# Patient Record
Sex: FEMALE | Race: WHITE | NOT HISPANIC OR LATINO
[De-identification: names, ages, dates, MRNs, and addresses within clinical notes are randomized per-mention and may not be internally consistent; named-entity substitution may affect disease eponyms.]

---

## 2021-12-13 ENCOUNTER — APPOINTMENT (OUTPATIENT)
Dept: BREAST CENTER | Facility: CLINIC | Age: 46
End: 2021-12-13
Payer: COMMERCIAL

## 2021-12-13 ENCOUNTER — NON-APPOINTMENT (OUTPATIENT)
Age: 46
End: 2021-12-13

## 2021-12-13 VITALS
BODY MASS INDEX: 23.62 KG/M2 | DIASTOLIC BLOOD PRESSURE: 76 MMHG | HEART RATE: 85 BPM | SYSTOLIC BLOOD PRESSURE: 112 MMHG | WEIGHT: 165 LBS | HEIGHT: 70 IN

## 2021-12-13 PROCEDURE — 99205 OFFICE O/P NEW HI 60 MIN: CPT

## 2021-12-13 PROCEDURE — 99072 ADDL SUPL MATRL&STAF TM PHE: CPT

## 2021-12-22 ENCOUNTER — NON-APPOINTMENT (OUTPATIENT)
Age: 46
End: 2021-12-22

## 2021-12-27 ENCOUNTER — TRANSCRIPTION ENCOUNTER (OUTPATIENT)
Age: 46
End: 2021-12-27

## 2021-12-29 ENCOUNTER — NON-APPOINTMENT (OUTPATIENT)
Age: 46
End: 2021-12-29

## 2021-12-31 ENCOUNTER — NON-APPOINTMENT (OUTPATIENT)
Age: 46
End: 2021-12-31

## 2022-01-03 ENCOUNTER — APPOINTMENT (OUTPATIENT)
Dept: GYNECOLOGIC ONCOLOGY | Facility: CLINIC | Age: 47
End: 2022-01-03
Payer: COMMERCIAL

## 2022-01-03 ENCOUNTER — NON-APPOINTMENT (OUTPATIENT)
Age: 47
End: 2022-01-03

## 2022-01-03 VITALS
OXYGEN SATURATION: 97 % | SYSTOLIC BLOOD PRESSURE: 120 MMHG | WEIGHT: 165 LBS | TEMPERATURE: 97.6 F | RESPIRATION RATE: 18 BRPM | DIASTOLIC BLOOD PRESSURE: 79 MMHG | HEIGHT: 70 IN | BODY MASS INDEX: 23.62 KG/M2 | HEART RATE: 96 BPM

## 2022-01-03 DIAGNOSIS — Z80.0 FAMILY HISTORY OF MALIGNANT NEOPLASM OF DIGESTIVE ORGANS: ICD-10-CM

## 2022-01-03 PROCEDURE — 99204 OFFICE O/P NEW MOD 45 MIN: CPT

## 2022-01-03 PROCEDURE — 99072 ADDL SUPL MATRL&STAF TM PHE: CPT

## 2022-01-03 NOTE — PHYSICAL EXAM
[Normal] : Recto-Vaginal Exam: Normal [Fully active, able to carry on all pre-disease performance without restriction] : Status 0 - Fully active, able to carry on all pre-disease performance without restriction [Abnormal] : Adnexa(ae): Abnormal

## 2022-01-03 NOTE — HISTORY OF PRESENT ILLNESS
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 US-core biopsy is recommended. BIRADS 5. \par \par 2) US-core biopsy left breast 4:00-N5 (21): Poorly differentiated invasive ductal carcinoma. Malignant. Pending receptors. In addition to the US mass which was sampled, there are adjacent pleomorphic microcalcifications on the mammogram suggesting involvement of at least a 3 cm area. Tubule formation 3/3, nuclear pleomorphism 3/3, mitotic counts 2/3), measuring 1.6 cm in maximal length in this material. ER 96%, IA 68%, HER2 john score 3+, Ki-67 51%.\par \par 3) [facility] Genetic Testing (21) showed one pathologic variant in APC. c 7715 C>G ([id number]*), heterozygous. Associated with autosomal dominant familial adenomatous polyposis, attenuated familial adenomatous polyposis and gastric adenocarcinoma and proximal polyposis of the stomach. \par \par 4) MRI Bilateral Breasts w/wo Contrast (21): There is a 2.5 x 2.7 x 1.8 cm lobulated enhancing mass in the lower outer left breast consistent with the recently diagnosed malignancy. No significant axillary or internal mammary LAD. There is a focal area of clumped non-mass enhancement which extends from the anterior and inferior aspect of the mass likely representing intraductal tumor extension. No abnormal enhancement or enhancing masses seen elsewhere in the left breast or right breast. BIRADS 6. 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 \par \par [address] yo [address] Matty Rivera (Breast Surgeon) for high-risk Gyn screening. Patient scheduled to undergo L stereotactic biopsy for adjacent calcifications seen on mammogram. Patient planning to receive receiving neoadjuvant chemotherapy with TCHP x 6 cycles with Dr. Garcia to start 22. Patient genetic testing with APC gene mutation, pending full genetic counseling appointment. \par \par OBHx: . Denies miscarriages or . Nulliparous\par GYNHx: LMP 21, lasts 4-5 days, occurs every 28 days. Uses menstrual cups\par MedHx: None\par SurgHx: Deviated septum, cardiac ablation for ventricular tachycardia\par FamH: Maternal grandmother had colon cancer. No other known cancers in the family. \par Meds: None. Denies herbal remedies\par Allergies: Sulphite (rash if topical, anaphylaxis if injected), novocaine/lidocaine (migraine/photosensitivity/burning at site of injection), calcium citrate (anaphylactic). Cats and dogs. \par Social Hx: Socially smoked over pandemic. Social drinker. Not sexually active. \par \par HCM\par Denies weight loss. Maintains appetite. Has not been sleeping well recently due to anxiety. Denies cough, shortness of breath, palpitations, headaches, visual changes, diarrhea/constipation, nausea, and vomiting. Fever. \par \par Last Pap smear, approximately 2 months ago, in 2021 at walk-in Gyn clinic in [place], normal per patient. She does Pap smears every 6 months for HPV+

## 2022-01-03 NOTE — DISCUSSION/SUMMARY
[Reviewed Clinical Lab Test(s)] : Results of clinical tests were reviewed. [Reviewed Radiology Report(s)] : Radiology reports were reviewed. [Visit Time ___ Minutes] : [unfilled] minutes [FreeTextEntry1] : 45 yo F presents for evaluation of palpable left breast mass s/p US-guided core biopsy (12/01/21), with pathology consistent with ER/TX/HER2+ poorly differentiated invasive ductal carcinoma of L breast. Initially mammogram and US (11/18/21) showed LUQ 3:00, 2.4 cm irregular shaped mass with associated L3-4:00 pleomorphic microcalcifications. BIRADS 5. The mass and calcifications combined suggests carcinoma to be at least a 3cm area. Planning for TCHP chemotherapy x 6 cycles with Dr. Garcia, starting 01/04/22. Incidentally found to be APC heterozygous. \par \par - Explained to patient that given ER/TX+; her oncologist may start her on tamoxifen as adjuvant therapy, which may increase the risk of ovarian cysts, endometrial thickening, endometrial cancer, uterine sarcoma, DVT. \par - Asked Dr. Garcia to call back to describe her particular concerns for this patient. \par - APC heterozygosity is not commonly associated with gynecological malignancy\par - Obtain TVUS for evaluation of the ovaries to determine a baseline\par - Follow-up in 6 months\par

## 2022-01-03 NOTE — PAST MEDICAL HISTORY
[Definite ___ (Date)] : the last menstrual period was [unfilled] [Normal Amount/Duration] : it was of a normal amount and duration [Normal Duration] : the duration was normal [Regular Cycle Intervals] : have been regular [Total Preg ___] : G[unfilled]

## 2022-01-05 ENCOUNTER — NON-APPOINTMENT (OUTPATIENT)
Age: 47
End: 2022-01-05

## 2022-03-07 ENCOUNTER — APPOINTMENT (OUTPATIENT)
Dept: HEMATOLOGY ONCOLOGY | Facility: CLINIC | Age: 47
End: 2022-03-07

## 2022-03-14 ENCOUNTER — APPOINTMENT (OUTPATIENT)
Dept: HEMATOLOGY ONCOLOGY | Facility: CLINIC | Age: 47
End: 2022-03-14
Payer: COMMERCIAL

## 2022-03-14 PROCEDURE — 99204 OFFICE O/P NEW MOD 45 MIN: CPT | Mod: 95

## 2022-03-29 ENCOUNTER — NON-APPOINTMENT (OUTPATIENT)
Age: 47
End: 2022-03-29

## 2022-03-29 ENCOUNTER — APPOINTMENT (OUTPATIENT)
Dept: HEMATOLOGY ONCOLOGY | Facility: CLINIC | Age: 47
End: 2022-03-29

## 2022-04-11 NOTE — ASSESSMENT
[FreeTextEntry1] : The visit was provided via telehealth using real-time 2-way audio visual technology. The patient, Izabel Montalvo, was located at home in [place], [place] at the time of the visit. The genetic counselor, Corrie Stanford, was located at the medical offices in [place], [place]. The physician, Dr. Jeffrey Jean, was in [place], [place]. Verbal consent for telehealth services was given on 3/14/22 by the patient, Izabel Montalvo.\par \par REASON FOR CONSULT\par Izabel Montalvo is a 46-year-old female referred by Dr. Tamela Lakhani for cancer genetic counseling and a discussion regarding her positive genetic testing results related to hereditary cancer predisposition. Ms. Montalvo was seen via telehealth on 2022 at which time medical and family history was ascertained and a pedigree constructed. \par \par RELEVANT MEDICAL HISTORY\par Ms. Montalvo was diagnosed with left breast cancer in 2021 at the age of 46. Pathology report revealed invasive ductal carcinoma (ER+/GA+/HER2+) and DCIS (ER-/GA-). She is currently receiving neoadjuvant chemotherapy prior to proceeding with surgery. She pursued genetic testing on  using [employer]’s Common Hereditary Cancers Panel, ordered by Dr. Lakhani, and a pathogenic mutation was detected in the APC gene (c.7715C>G; [id number]*).\par \par Of note, Ms. Montalvo has never had a colonoscopy.\par \par OTHER MEDICAL AND SURGICAL HISTORY:\par •	Medical History: ventricular tachycardia (s/p ablation)\par •	Surgical History: deviated septum\par \par HORMONAL HISTORY:\par Obstetrical History: \par Age at Menarche: 16\par Menopausal Status: Premenopausal \par Age at First Live Birth: N/A\par Oral Contraceptive Use: Yes, at least 5 years\par Hormone Replacement Therapy: No\par \par CANCER SCREENING HISTORY:  \par Breast: \par •	Mammography: 21- rec L biopsy\par •	Sonography: 21- rec L biopsy\par •	MRI: 21- rec additional L biopsy\par GYN:\par •	Pelvic Examination: Annual- reportedly wnl\par Colon:\par •	Colonoscopy: No\par •	Upper Endoscopy: No \par Skin:  \par •	FBSE: Yes (many years ago)\par •	Lesions biopsied/removed: Yes- stomach (reportedly benign)\par \par SOCIAL HISTORY:\par •	Tobacco-product use: No\par •	Environmental exposures: No\par \par FAMILY HISTORY:\par Maternal ancestry was reported as Algerian and paternal ancestry was reported as Serbian. Ashkenazi Restoration ancestry is unknown. A detailed family history of cancer was ascertained, see below and scanned chart for pedigree. \par \par Of note, Ms. Mnotalvo reported her mother receives colonoscopies almost every year due to a history of colon polyps. \par \par Additionally, Ms. Montalvo has very limited information on paternal family history of cancer.\par \par According to Ms. Montalvo no one else in the family has had germline testing for cancer susceptibility. Consanguinity was denied. \par \par RESULTS INTERPRETATION AND ASSESSMENT:\par We reviewed with Ms. Montalvo that she tested positive for a pathogenic mutation in the APC gene.  Germline mutations in the APC gene are associated with autosomal-dominant colorectal cancer predisposition syndromes called Familial Adenomatous Polyposis (FAP), Attenuated Familial Adenomatous Polyposis (AFAP), and rarely Gastric Adenocarcinoma and Proximal Polyposis of the stomach (GAPPS).\par \par Individuals with classic FAP have hundreds to thousands of adenomatous polyps in their colon and rectum. Polyps may also develop in the stomach (fundic gland polyps) and small intestine. These adenomas usually begin to develop during the teenage years or young adulthood and, if left untreated, the risk of developing cancer by the early forties is close to 100%. Cancers of the thyroid (typically papillary thyroid carcinoma), pancreas, brain/CNS, and liver (rarely after 6 years of age) are also associated with FAP. Furthermore, Congenital Hypertrophy of Retinal Pigment Epithelium (CHRPE), desmoid tumors, osteomas, epidermoid cysts, extra teeth or dental abnormalities are features seen in individuals with FAP. \par \par Individuals with AFAP have a milder form of FAP. These individuals develop fewer adenomatous polyps (less than 100) usually at a later age (approximately 50 years of age) than individual with classic FAP. They have an overall lower lifetime risk of developing colorectal cancer (up to 70%) and have variable risks for extracolonic manifestations such as thyroid cancer gastric polyps or cancer, and duodenal polyps or cancer. \par \par Of note, the risk of developing FAP vs. AFAP vs. GAPPS is often correlated with the position of the mutation in the APC gene. Mutations in the 3’ end of the gene, like the mutation Ms. Montalvo harbors, typically correlate with an AFAP presentation however mutations in the 3’ end of the gene also tend to be seen more often in individuals with a personal or family history of desmoid tumors. \par \par We discussed with Ms. Montalvo that until she undergoes colonoscopy to assess for the presence and number of polyps, we cannot be certain which form of FAP she presents with. Given this, we have listed both screening guidelines for both FAP and AFAP below. We recommend she return for a follow-up visit after she undergoes colonoscopy to re-review appropriate screening protocol.  \par \par PATIENT MANAGEMENT IMPLICATIONS:\par Given Ms. Montalvo’s personal and current reported family history of cancer, and her APC positive genetic test results, the following screening guidelines and risk-reducing recommendations were discussed:\par \par BREAST:\par •	It was discussed that there is no current data to suggest an increased risk for breast cancer associated with mutations in the APC gene.\par •	Management and long-term surveillance should be based on Ms. Montalvo’s on- or post-treatment protocol as recommended by her breast care team.\par \par COLON: \par •	We recommended Ms. Montalvo undergo a baseline colonoscopy after she finishes her neoadjuvant chemotherapy regimen. \par •	Repeat colonoscopies every 1-2 years if polyp burden remains manageable endoscopically.\par •	If polyp burden is not manageable endoscopically, consider colectomy.\par \par DUODENAL:\par •	We recommended Ms. Montalvo undergo a baseline endoscopy in conjunction with colonoscopy after she finishes her neoadjuvant chemotherapy regimen. \par •	Frequency of upper endoscopy surveillance is dependent on polyp burden and family history however if normal, repeat endoscopy every 3-5 years.\par \par THYROID:\par •	Baseline thyroid ultrasound was recommended for Ms. Montalvo after her breast cancer has stabilized.\par •	If normal, consider repeating the ultrasound every 2-5 years.\par •	If abnormal, consider referral to a thyroid specialist. \par \par GASTRIC: Classic FAP Only\par •	Gastric cancer screening is individualized in the setting of FAP. Then need for specialized surveillance or surgery should be considered in presence of high-risk histologic features, preferably at a center of expertise \par \par CNS TUMORS: Classic FAP Only\par •	We encouraged prompt reportion of abnormal symptoms or signs of neurological cancers to the patient’s physician.\par \par DESMOID TUMORS: Classic FAP Only\par •	Suggestive abdominal symptoms should prompt immediate abdominal imaging. If personal history of symptomatic desmoids, consider imaging with abdominal MRI (with and without contrast) or CT (with contrast) no less frequently than annually.\par \par PANCREATIC: Classic FAP Only\par •	High-level evidence to support routine pancreatic screening is lacking. Screening on a research basis may be considered for individuals with a family history of pancreatic cancer presenting with classic FAP. Given the absence of pancreatic cancer in Ms. Montalvo’s family, pancreatic screening is not recommended at this time. \par SMALL BOWEL: Classic FAP Only\par •	High-level evidence to support routine bowel screening distal to the duodenum is lacking; however, consider small bowel visualization, especially if advanced duodenal polyposis.\par \par HEPATIC: Classic FAP Only (childhood)\par •	Consider liver palpation, abdominal ultrasound and AFP measurement every 3-6 months for the first 5 years of life due to the risk of hepatoblastoma.\par \par OTHER:\par •	We recommended Ms. Montalvo undergo a thorough annual physical examination with special attention paid to the abdominal area to palpate for desmoid tumors given the location of her APC mutation even if we determine her presentation correlates with AFAP.\par •	In the absence of other indications, Ms. Montalvo should practice age-appropriate cancer screening of other organ systems as recommended for the general population.\par \par FAMILY MEMBER AND REPRODUCTIVE IMPLICATIONS:\par This mutation is inherited in an autosomal dominant pattern. We recommend the patient’s first-degree relatives, specifically her siblings and parents, pursue genetic counseling and genetic testing as there is a 50% chance they also have the same mutation. Ms. Montalvo was made aware that if any at-risk relatives wanted to pursue genetic testing any time in the future, we would be happy to see them and coordinate testing. \par \par Please note, since screening for hepatoblastoma begins in infancy, genetic testing for infants and children is recommended.\par \par Additionally, for anyone found to carry this APC mutation the risk of passing on this mutation to a future generation is 50%. Since the genetic mutation is known, pre-implantation genetic diagnosis (PGD) is possible. PGD and prenatal diagnosis were discussed briefly.\par \par \par PLAN:\par 1.	See above note for recommended management.\par 2.	We recommended the patient schedule an additional follow-up session with us after she undergoes baseline colonoscopy to determine appropriate screening protocol.\par 3.	We encourage sharing these results with family members.  They have a risk to have inherited the same mutation.  Other family may benefit from genetic testing and should contact a certified genetic counselor specializing in cancer.  Due to HIPAA and [place] laws, we are unable to directly contact other family at risk, but we are available should family members wish to reach out to us.\par 4.	Clinic note will be available in the patient’s [facility] portal.\par 5.	Genetic knowledge changes rapidly. We encouraged re-contacting Cancer Genetics every 2-3 years for any changes in screening recommendations or sooner if there are significant changes in personal or family history.\par \par For any additional questions please call Cancer Genetics at [phone]. \par \par \par Corrie Stanford MS, [facility] [place] [facility]\par Genetic Counselor, Cancer Genetics\par \par \par Attending Attestation:\par \par I have reviewed and edited the genetic counselor's note and I agree with the assessment and plan as documented. I spent approximately 45 minutes in total time of which approximately 20 minutes was face-to-face (via 2-way audiovisual telemedicine connection) with Ms. Montalvo reviewing her relevant personal and family history, the genetic testing results, our risk-assessment, and options for future cancer risk-reduction for the patient and her relevant family members. Over half this time was spent in counseling and coordination of care.\par \par Jeffrey Jean MD\par Chief, Cancer Genetics\par [facility]\par \par \par CC: \par Tamela Lakhani MD\par \par \par \par \par

## 2022-05-04 ENCOUNTER — OUTPATIENT (OUTPATIENT)
Dept: OUTPATIENT SERVICES | Facility: HOSPITAL | Age: 47
LOS: 1 days | End: 2022-05-04
Payer: COMMERCIAL

## 2022-05-04 ENCOUNTER — RESULT REVIEW (OUTPATIENT)
Age: 47
End: 2022-05-04

## 2022-05-04 DIAGNOSIS — C50.912 MALIGNANT NEOPLASM OF UNSPECIFIED SITE OF LEFT FEMALE BREAST: ICD-10-CM

## 2022-05-04 LAB — SURGICAL PATHOLOGY STUDY: SIGNIFICANT CHANGE UP

## 2022-05-04 PROCEDURE — 88321 CONSLTJ&REPRT SLD PREP ELSWR: CPT

## 2022-05-05 ENCOUNTER — NON-APPOINTMENT (OUTPATIENT)
Age: 47
End: 2022-05-05

## 2022-05-05 ENCOUNTER — APPOINTMENT (OUTPATIENT)
Dept: BREAST CENTER | Facility: CLINIC | Age: 47
End: 2022-05-05
Payer: COMMERCIAL

## 2022-05-05 VITALS
WEIGHT: 171 LBS | DIASTOLIC BLOOD PRESSURE: 69 MMHG | HEIGHT: 70 IN | SYSTOLIC BLOOD PRESSURE: 112 MMHG | HEART RATE: 93 BPM | BODY MASS INDEX: 24.48 KG/M2

## 2022-05-05 DIAGNOSIS — R92.8 OTHER ABNORMAL AND INCONCLUSIVE FINDINGS ON DIAGNOSTIC IMAGING OF BREAST: ICD-10-CM

## 2022-05-05 PROCEDURE — 99214 OFFICE O/P EST MOD 30 MIN: CPT

## 2022-05-05 PROCEDURE — 99072 ADDL SUPL MATRL&STAF TM PHE: CPT

## 2022-05-13 ENCOUNTER — NON-APPOINTMENT (OUTPATIENT)
Age: 47
End: 2022-05-13

## 2022-05-16 ENCOUNTER — NON-APPOINTMENT (OUTPATIENT)
Age: 47
End: 2022-05-16

## 2022-05-19 ENCOUNTER — NON-APPOINTMENT (OUTPATIENT)
Age: 47
End: 2022-05-19

## 2022-06-12 ENCOUNTER — RESULT REVIEW (OUTPATIENT)
Age: 47
End: 2022-06-12

## 2022-06-14 ENCOUNTER — APPOINTMENT (OUTPATIENT)
Dept: BREAST CENTER | Facility: AMBULATORY SURGERY CENTER | Age: 47
End: 2022-06-14

## 2022-06-15 ENCOUNTER — OUTPATIENT (OUTPATIENT)
Dept: OUTPATIENT SERVICES | Facility: HOSPITAL | Age: 47
LOS: 1 days | End: 2022-06-15
Payer: COMMERCIAL

## 2022-06-15 ENCOUNTER — APPOINTMENT (OUTPATIENT)
Dept: NUCLEAR MEDICINE | Facility: HOSPITAL | Age: 47
End: 2022-06-15

## 2022-06-15 ENCOUNTER — TRANSCRIPTION ENCOUNTER (OUTPATIENT)
Age: 47
End: 2022-06-15

## 2022-06-15 DIAGNOSIS — Z98.890 OTHER SPECIFIED POSTPROCEDURAL STATES: Chronic | ICD-10-CM

## 2022-06-15 DIAGNOSIS — Z92.21 PERSONAL HISTORY OF ANTINEOPLASTIC CHEMOTHERAPY: Chronic | ICD-10-CM

## 2022-06-15 PROCEDURE — 78195 LYMPH SYSTEM IMAGING: CPT | Mod: 26

## 2022-06-15 PROCEDURE — 78195 LYMPH SYSTEM IMAGING: CPT

## 2022-06-15 PROCEDURE — A9541: CPT

## 2022-06-15 NOTE — ASU PATIENT PROFILE, ADULT - FALL HARM RISK - UNIVERSAL INTERVENTIONS
Bed in lowest position, wheels locked, appropriate side rails in place/Call bell, personal items and telephone in reach/Instruct patient to call for assistance before getting out of bed or chair/Non-slip footwear when patient is out of bed/[place] to call system/Physically safe environment - no spills, clutter or unnecessary equipment/Purposeful Proactive Rounding/Room/bathroom lighting operational, light cord in reach

## 2022-06-15 NOTE — ASU PATIENT PROFILE, ADULT - AS SC BRADEN MOISTURE
(4) rarely moist Dupixent Counseling: I discussed with the patient the risks of dupilumab including but not limited to eye infection and irritation, cold sores, injection site reactions, worsening of asthma, allergic reactions and increased risk of parasitic infection.  Live vaccines should be avoided while taking dupilumab. Dupilumab will also interact with certain medications such as warfarin and cyclosporine. The patient understands that monitoring is required and they must alert us or the primary physician if symptoms of infection or other concerning signs are noted.

## 2022-06-15 NOTE — ASU PATIENT PROFILE, ADULT - NS PREOP UNDERSTANDS INFO
leave valuables/jewelry/contacts at home, make sure to have escort 18+, bring photo ID, insurance card + covid vax card, no solid foods after 23:00, water/apple juice/gatorade until 0430/yes

## 2022-06-15 NOTE — ASU PATIENT PROFILE, ADULT - NSICDXPASTSURGICALHX_GEN_ALL_CORE_FT
PAST SURGICAL HISTORY:  H/O cardiac radiofrequency ablation     Status post chemotherapy     Status post manipulation of deviated nasal septum

## 2022-06-16 ENCOUNTER — APPOINTMENT (OUTPATIENT)
Dept: BREAST CENTER | Facility: HOSPITAL | Age: 47
End: 2022-06-16

## 2022-06-16 ENCOUNTER — TRANSCRIPTION ENCOUNTER (OUTPATIENT)
Age: 47
End: 2022-06-16

## 2022-06-16 ENCOUNTER — RESULT REVIEW (OUTPATIENT)
Age: 47
End: 2022-06-16

## 2022-06-16 ENCOUNTER — OUTPATIENT (OUTPATIENT)
Dept: OUTPATIENT SERVICES | Facility: HOSPITAL | Age: 47
LOS: 1 days | Discharge: HOME | End: 2022-06-16
Payer: COMMERCIAL

## 2022-06-16 ENCOUNTER — APPOINTMENT (OUTPATIENT)
Dept: BREAST CENTER | Facility: AMBULATORY SURGERY CENTER | Age: 47
End: 2022-06-16

## 2022-06-16 VITALS
SYSTOLIC BLOOD PRESSURE: 108 MMHG | HEART RATE: 71 BPM | RESPIRATION RATE: 16 BRPM | TEMPERATURE: 97 F | OXYGEN SATURATION: 98 % | DIASTOLIC BLOOD PRESSURE: 62 MMHG

## 2022-06-16 VITALS
HEART RATE: 77 BPM | TEMPERATURE: 99 F | RESPIRATION RATE: 14 BRPM | HEIGHT: 70 IN | SYSTOLIC BLOOD PRESSURE: 105 MMHG | DIASTOLIC BLOOD PRESSURE: 60 MMHG | WEIGHT: 171.52 LBS | OXYGEN SATURATION: 98 %

## 2022-06-16 DIAGNOSIS — Z92.21 PERSONAL HISTORY OF ANTINEOPLASTIC CHEMOTHERAPY: Chronic | ICD-10-CM

## 2022-06-16 DIAGNOSIS — Z98.890 OTHER SPECIFIED POSTPROCEDURAL STATES: Chronic | ICD-10-CM

## 2022-06-16 PROBLEM — R94.30 ABNORMAL RESULT OF CARDIOVASCULAR FUNCTION STUDY, UNSPECIFIED: Chronic | Status: ACTIVE | Noted: 2022-06-15

## 2022-06-16 PROBLEM — C50.919 MALIGNANT NEOPLASM OF UNSPECIFIED SITE OF UNSPECIFIED FEMALE BREAST: Chronic | Status: ACTIVE | Noted: 2022-06-15

## 2022-06-16 LAB
ISTAT VENOUS GLUCOSE: 85 MG/DL — SIGNIFICANT CHANGE UP (ref 70–99)
ISTAT VENOUS HEMATOCRIT: 33 % — LOW (ref 34.5–45)
ISTAT VENOUS HEMOGLOBIN: 11.2 GM/DL — LOW (ref 11.5–15.5)
ISTAT VENOUS IONIZED CALCIUM: 1.28 MMOL/L — SIGNIFICANT CHANGE UP (ref 1.12–1.3)
ISTAT VENOUS PCO2: 45 MMHG — SIGNIFICANT CHANGE UP (ref 41–51)
ISTAT VENOUS PH: 7.38 — SIGNIFICANT CHANGE UP (ref 7.31–7.41)
ISTAT VENOUS PO2: <66 MMHG — LOW (ref 35–40)
ISTAT VENOUS POTASSIUM: 4.1 MMOL/L — SIGNIFICANT CHANGE UP (ref 3.5–5.3)
ISTAT VENOUS SODIUM: 141 MMOL/L — SIGNIFICANT CHANGE UP (ref 135–145)
ISTAT VENOUS TCO2: 28 MMOL/L — SIGNIFICANT CHANGE UP (ref 22–31)

## 2022-06-16 PROCEDURE — 76098 X-RAY EXAM SURGICAL SPECIMEN: CPT | Mod: 26

## 2022-06-16 PROCEDURE — 88307 TISSUE EXAM BY PATHOLOGIST: CPT | Mod: 26

## 2022-06-16 PROCEDURE — 38525 BIOPSY/REMOVAL LYMPH NODES: CPT | Mod: LT

## 2022-06-16 PROCEDURE — 19303 MAST SIMPLE COMPLETE: CPT | Mod: 50

## 2022-06-16 PROCEDURE — 88305 TISSUE EXAM BY PATHOLOGIST: CPT | Mod: 26

## 2022-06-16 PROCEDURE — 88331 PATH CONSLTJ SURG 1 BLK 1SPC: CPT | Mod: 26

## 2022-06-16 RX ORDER — OXYCODONE HYDROCHLORIDE 5 MG/1
10 TABLET ORAL ONCE
Refills: 0 | Status: DISCONTINUED | OUTPATIENT
Start: 2022-06-16 | End: 2022-06-16

## 2022-06-16 RX ORDER — ACETAMINOPHEN 500 MG
650 TABLET ORAL EVERY 6 HOURS
Refills: 0 | Status: DISCONTINUED | OUTPATIENT
Start: 2022-06-16 | End: 2022-06-16

## 2022-06-16 RX ORDER — APREPITANT 80 MG/1
40 CAPSULE ORAL ONCE
Refills: 0 | Status: COMPLETED | OUTPATIENT
Start: 2022-06-16 | End: 2022-06-16

## 2022-06-16 RX ORDER — SODIUM CHLORIDE 9 MG/ML
1000 INJECTION, SOLUTION INTRAVENOUS
Refills: 0 | Status: DISCONTINUED | OUTPATIENT
Start: 2022-06-16 | End: 2022-06-16

## 2022-06-16 RX ORDER — ONDANSETRON 8 MG/1
4 TABLET, FILM COATED ORAL EVERY 4 HOURS
Refills: 0 | Status: DISCONTINUED | OUTPATIENT
Start: 2022-06-16 | End: 2022-06-16

## 2022-06-16 RX ORDER — OXYCODONE HYDROCHLORIDE 5 MG/1
5 TABLET ORAL ONCE
Refills: 0 | Status: DISCONTINUED | OUTPATIENT
Start: 2022-06-16 | End: 2022-06-16

## 2022-06-16 RX ORDER — ACETAMINOPHEN 500 MG
1000 TABLET ORAL ONCE
Refills: 0 | Status: COMPLETED | OUTPATIENT
Start: 2022-06-16 | End: 2022-06-16

## 2022-06-16 RX ORDER — METOPROLOL TARTRATE 50 MG
1 TABLET ORAL
Qty: 0 | Refills: 0 | DISCHARGE

## 2022-06-16 RX ADMIN — APREPITANT 40 MILLIGRAM(S): 80 CAPSULE ORAL at 07:06

## 2022-06-16 RX ADMIN — SODIUM CHLORIDE 75 MILLILITER(S): 9 INJECTION, SOLUTION INTRAVENOUS at 13:13

## 2022-06-16 RX ADMIN — Medication 1000 MILLIGRAM(S): at 07:06

## 2022-06-16 NOTE — BRIEF OPERATIVE NOTE - OPERATION/FINDINGS
Procedure of bilateral total mastectomy and left sentinel node biopsy was performed under general anesthesia. Bilateral breast tissue and left lymph nodes x2 were removed by surgical team. Breast tissue closure was completed without reconstruction my plastic surgery team. Bilateral OLIVIA drains were placed. Approx 15cc of blood loss. Hemostatics with proper closure were achieved. Procedure of bilateral total mastectomy and left sentinel node biopsy was performed under general anesthesia. Bilateral breast tissue and left lymph nodes x2 were removed by surgical team and sent as fresh to pathology. Frozen specimen of both lymph nodes were evaluate intraoperatively and were negative for malignancy. Breast tissue closure was completed without reconstruction my plastic surgery team. Bilateral OLIVIA drains were placed. Approx 15cc of blood loss. Hemostatics with proper closure were achieved.

## 2022-06-16 NOTE — BRIEF OPERATIVE NOTE - NSICDXBRIEFPROCEDURE_GEN_ALL_CORE_FT
PROCEDURES:  Mastectomy, total 16-Jun-2022 10:08:29  Melinda Santos  Mastectomy, with sentinel node dissection 16-Jun-2022 10:09:54  Melinda Santos

## 2022-06-16 NOTE — ASU DISCHARGE PLAN (ADULT/PEDIATRIC) - PROVIDER TOKENS
PROVIDER:[TOKEN:[[id number]],ESTABLISHEDPATIENT:[T]],PROVIDER:[TOKEN:[[id number]],ESTABLISHEDPATIENT:[T]]

## 2022-06-16 NOTE — ASU DISCHARGE PLAN (ADULT/PEDIATRIC) - NS MD DC FALL RISK RISK
For information on Fall & Injury Prevention, visit: [email or web address] OR  [email or web address] OR  https://www.cdc.gov/steadi/patient.html

## 2022-06-16 NOTE — BRIEF OPERATIVE NOTE - NSICDXBRIEFPREOP_GEN_ALL_CORE_FT
PRE-OP DIAGNOSIS:  Breast cancer, female 16-Jun-2022 10:10:17  Melinda Santos  Invasive ductal carcinoma of breast 16-Jun-2022 10:11:15  Melinda Santos  Breast neoplasm, Tis (DCIS), left 16-Jun-2022 10:11:22  Melinda Santos

## 2022-06-16 NOTE — BRIEF OPERATIVE NOTE - NSICDXBRIEFPOSTOP_GEN_ALL_CORE_FT
POST-OP DIAGNOSIS:  Invasive ductal carcinoma of breast 16-Jun-2022 10:10:40  Melinda Santos  Breast neoplasm, Tis (DCIS), left 16-Jun-2022 10:10:52  Melinda Santos  Breast cancer, female 16-Jun-2022 10:11:04  Melinda Santos

## 2022-06-16 NOTE — ASU DISCHARGE PLAN (ADULT/PEDIATRIC) - D. IF YOU HAD ANY TYPE OF SEDATION, YOU MAY EXPERIENCE LIGHTHEADEDNESS, DIZZINESS, OR SLEEPINESS FOLLOWING YOUR PROCEDURE. A RESPONSIBLE ADULT SHOULD STAY WITH YOU FOR AT LEAST 24 HOURS FOLLOWING YOUR PROCEDURE.
Indication/Procedure    Hx of colon cancer, possible bleeding and anemia      Past Medical History  Past Medical History:   Diagnosis Date   • Anxiety    • Arthritis    • Blood clot associated with vein wall inflammation    • Chronic kidney disease     CKD   • Colon cancer (CMS/HCC)        • Diabetes mellitus (CMS/HCC)    • DVT of proximal lower limb (CMS/HCC) 2010    Left leg   • Essential (primary) hypertension    • Fracture    • Gross hematuria    • High cholesterol    • Left foot drop    • PONV (postoperative nausea and vomiting)    • Rotator cuff arthropathy, right     RIGHT AND LEFT   • Sleep apnea     USES CPAP. TO BRING DOS.    • Urinary incontinence         Surgical History  Past Surgical History:   Procedure Laterality Date   • Administer infusion chemotherapy      LAST RECIEVED CHEMOTHERAPY IN  FOR COLON CANCER   • Biceps tendon repair Right    • Cervical fusion  2019   • Colectomy      Due to cancer.   • Fracture surgery Right     SOFIA PLACED TO RIGHT ARM   • Joint replacement      knee   • Radiation treatment      LAST RADIATION IN  FOR COLON CANCER   • Rotator cuff repair Bilateral  AND    • Total knee arthroplasty Right 2019   • Ulnar nerve repair Right 05/15/2019    ulnar nerve decompression        Social History  Social History     Tobacco Use   • Smoking status: Former Smoker     Packs/day: 1.00     Types: Cigarettes     Quit date: 2009     Years since quittin.6   • Smokeless tobacco: Never Used   Vaping Use   • Vaping Use: never used   Substance Use Topics   • Alcohol use: Not Currently   • Drug use: Never       Family History  Family History   Problem Relation Age of Onset   • Aneurysm Mother         BRAIN   • Diabetes Father         Allergies  ALLERGIES:  Contrast media and Iodinated diagnostic agents    Medications  (Not in a hospital admission)      Review of Systems  Review of Systems      Last Recorded Vitals  Blood pressure (!) 173/85,  pulse 80, temperature 97.5 °F (36.4 °C), resp. rate 14, height 5' 11\" (1.803 m), weight 106.6 kg (235 lb), SpO2 100 %.    Physical Exam  Constitutional:       Appearance: He is well-developed.   HENT:      Head: Normocephalic.   Eyes:      General: No scleral icterus.  Cardiovascular:      Rate and Rhythm: Normal rate.   Pulmonary:      Effort: Pulmonary effort is normal.   Abdominal:      General: Bowel sounds are normal. There is no distension.      Palpations: Abdomen is soft. There is no mass.      Tenderness: There is no abdominal tenderness. There is no guarding or rebound.   Musculoskeletal:         General: Normal range of motion.      Cervical back: Neck supple.   Skin:     Findings: No rash.   Neurological:      Mental Status: He is alert and oriented to person, place, and time.   Psychiatric:         Behavior: Behavior normal.        1) Hx of colon cancer  2) Possible GI bleed  3) Anemia    colonoscopy    Primary Care Physician  Lencho Carrillo MD         Statement Selected

## 2022-06-16 NOTE — ASU DISCHARGE PLAN (ADULT/PEDIATRIC) - ASU DC SPECIAL INSTRUCTIONSFT
For specific wound care instructions, please follow up with plastic surgery (Dr. Gonzalez). Continue to evaluate drain output and wear compression bra until post op evaluation. For specific wound care instructions, please follow up with plastic surgery (Dr. Gonzalez). Continue to evaluate drain output and wear compression bra until post op evaluation.  - For the drains please empty them twice a day and keep a diary of the output. Bring the diary with you to your doctor's appointment

## 2022-06-16 NOTE — ASU DISCHARGE PLAN (ADULT/PEDIATRIC) - CARE PROVIDER_API CALL
Tamela Hunter)  Surgery  [address] [address]  Phone: [phone]  Fax: [phone]  Established Patient  Follow Up Time:     Travis Gonzalez  PLASTIC SURGERY  [address] [place] [id number]  Phone: [phone]  Fax: [phone]  Established Patient  Follow Up Time:

## 2022-06-16 NOTE — BRIEF OPERATIVE NOTE - COMMENTS
Please follow post operative instructions regarding drain measurements and dressing changes as per plastic surgery.

## 2022-06-21 ENCOUNTER — NON-APPOINTMENT (OUTPATIENT)
Age: 47
End: 2022-06-21

## 2022-06-21 LAB — SURGICAL PATHOLOGY STUDY: SIGNIFICANT CHANGE UP

## 2022-06-22 ENCOUNTER — NON-APPOINTMENT (OUTPATIENT)
Age: 47
End: 2022-06-22

## 2022-06-29 ENCOUNTER — APPOINTMENT (OUTPATIENT)
Dept: BREAST CENTER | Facility: CLINIC | Age: 47
End: 2022-06-29

## 2022-07-25 ENCOUNTER — TRANSCRIPTION ENCOUNTER (OUTPATIENT)
Age: 47
End: 2022-07-25

## 2022-07-29 ENCOUNTER — TRANSCRIPTION ENCOUNTER (OUTPATIENT)
Age: 47
End: 2022-07-29

## 2022-08-02 ENCOUNTER — APPOINTMENT (OUTPATIENT)
Dept: GYNECOLOGIC ONCOLOGY | Facility: CLINIC | Age: 47
End: 2022-08-02

## 2022-08-02 VITALS
DIASTOLIC BLOOD PRESSURE: 71 MMHG | WEIGHT: 160 LBS | SYSTOLIC BLOOD PRESSURE: 101 MMHG | OXYGEN SATURATION: 97 % | RESPIRATION RATE: 18 BRPM | HEIGHT: 70 IN | BODY MASS INDEX: 22.9 KG/M2 | TEMPERATURE: 97 F | HEART RATE: 89 BPM

## 2022-08-02 DIAGNOSIS — N94.10 UNSPECIFIED DYSPAREUNIA: ICD-10-CM

## 2022-08-02 DIAGNOSIS — Z00.00 ENCOUNTER FOR GENERAL ADULT MEDICAL EXAMINATION W/OUT ABNORMAL FINDINGS: ICD-10-CM

## 2022-08-02 PROCEDURE — 99072 ADDL SUPL MATRL&STAF TM PHE: CPT

## 2022-08-02 PROCEDURE — 99213 OFFICE O/P EST LOW 20 MIN: CPT

## 2022-08-02 NOTE — PHYSICAL EXAM
[Abnormal] : Adnexa(ae): Abnormal [Normal] : Recto-Vaginal Exam: Normal [Fully active, able to carry on all pre-disease performance without restriction] : Status 0 - Fully active, able to carry on all pre-disease performance without restriction

## 2022-08-02 NOTE — HISTORY OF PRESENT ILLNESS
[FreeTextEntry1] : Problem\par 1) Pre menopausal ER/NH+, HER2+ Poorly differentiated invasive ductal carcinoma of L breast\par 2) Pathogenic variant of APC gene \par \par Results/Previous Therapy\par 1) Mammogram and Tomosynthesis and Breast US (21): Left breast 3-4 o clock mass and calcification with suspicious morphologic features at the site of a palpable abnormality indicated by the patient. US-core biopsy is recommended. BIRADS 5. \par \par 2) US-core biopsy left breast 4:00-N5 (21): Poorly differentiated invasive ductal carcinoma. Malignant. Pending receptors. In addition to the US mass which was sampled, there are adjacent pleomorphic microcalcifications on the mammogram suggesting involvement of at least a 3 cm area. Tubule formation 3/3, nuclear pleomorphism 3/3, mitotic counts 2/3), measuring 1.6 cm in maximal length in this material. ER 96%, NH 68%, HER2 john score 3+, Ki-67 51%.\par \par 3) [facility] Genetic Testing (21) showed one pathologic variant in APC. c 7715 C>G ([id number]*), heterozygous. Associated with autosomal dominant familial adenomatous polyposis, attenuated familial adenomatous polyposis and gastric adenocarcinoma and proximal polyposis of the stomach. \par \par 4) MRI Bilateral Breasts w/wo Contrast (21): There is a 2.5 x 2.7 x 1.8 cm lobulated enhancing mass in the lower outer left breast consistent with the recently diagnosed malignancy. No significant axillary or internal mammary LAD. There is a focal area of clumped non-mass enhancement which extends from the anterior and inferior aspect of the mass likely representing intraductal tumor extension. No abnormal enhancement or enhancing masses seen elsewhere in the left breast or right breast. BIRADS 6. 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 \par 6) Bilateral total mastectomy and L sentinel lymph node biopsies 22\par   a) no residual cancer on specimen\par \par Here for urgent follow up for new dyspareunia. First noticed about 1 month ago that even with lubricant had severe (9 out of 10) pain with penetration. Had spotting and 2 days of tenderness after. Tried again and had pain, but less than before and just pink discharge no bleeding. This was the first activity in about 6 months. Has never had experience like this before. Off chemotherapy since May, had outpatient mastectomy in . Meeting Dr. Devine at the end of this month to discuss BSO +/- hysterectomy. Patient has APC mutation, history of HPV persistence but no high grade cervical dysplasia. Not currently on any anti hormone therapy, will discuss that after oophorectomy with oncologist Dr. Garcia. \par \par \par OBHx: . Denies miscarriages or . Nulliparous\par GYNHx: LMP 21, lasts 4-5 days, occurs every 28 days. Uses menstrual cups\par MedHx: None\par SurgHx: Deviated septum, cardiac ablation for ventricular tachycardia\par FamH: Maternal grandmother had colon cancer. No other known cancers in the family. \par Meds: None. Denies herbal remedies\par Allergies: Sulphite (rash if topical, anaphylaxis if injected), novocaine/lidocaine (migraine/photosensitivity/burning at site of injection), calcium citrate (anaphylactic). Cats and dogs. \par Social Hx: Socially smoked over pandemic. Social drinker. Not sexually active. \par \par HCM\par Denies weight loss. Maintains appetite. Has not been sleeping well recently due to anxiety. Denies cough, shortness of breath, palpitations, headaches, visual changes, diarrhea/constipation, nausea, and vomiting. Fever. \par \par Last Pap smear, approximately 2 months ago, in 2021 at walk-in Gyn clinic in [place], normal per patient. She does Pap smears every 6 months for HPV+

## 2022-08-02 NOTE — DISCUSSION/SUMMARY
[Reviewed Clinical Lab Test(s)] : Results of clinical tests were reviewed. [Reviewed Radiology Report(s)] : Radiology reports were reviewed. [Visit Time ___ Minutes] : [unfilled] minutes [FreeTextEntry1] : New dyspareunia in breast cancer survivor with APC mutation. Appears related to dryness on exam. No clear atrophy but areas of excoriation and irritation noted. \par \par []Replens, trial for 2 weeks then will contact me how she is feeling. If no improvement, consider topical estrogen (need clearance from oncologist)\par []Pelvic Floor PT referral given for possible muscular dysfunction\par [] Pelvic US  - possible L ovarian cyst on previous exam and today \par [] vaginitis panel sent today, no clear infection on exam\par [] pap/hpv done\par []Follow up plan depends on surgical plan with Dr. Devine and symptoms. TBD

## 2022-08-03 LAB
CYTOLOGY CVX/VAG DOC THIN PREP: NORMAL
HPV HIGH+LOW RISK DNA PNL CVX: NOT DETECTED

## 2022-08-04 LAB — CYTOLOGY CVX/VAG DOC THIN PREP: NORMAL

## 2022-08-05 ENCOUNTER — TRANSCRIPTION ENCOUNTER (OUTPATIENT)
Age: 47
End: 2022-08-05

## 2022-08-05 DIAGNOSIS — N76.0 ACUTE VAGINITIS: ICD-10-CM

## 2022-08-05 DIAGNOSIS — B96.89 ACUTE VAGINITIS: ICD-10-CM

## 2022-08-05 LAB
A VAGINAE DNA VAG QL NAA+PROBE: ABNORMAL
BVAB2 DNA VAG QL NAA+PROBE: ABNORMAL
C KRUSEI DNA VAG QL NAA+PROBE: NEGATIVE
C TRACH RRNA SPEC QL NAA+PROBE: NEGATIVE
MEGA1 DNA VAG QL NAA+PROBE: ABNORMAL
N GONORRHOEA RRNA SPEC QL NAA+PROBE: NEGATIVE
T VAGINALIS RRNA SPEC QL NAA+PROBE: NEGATIVE

## 2022-08-17 ENCOUNTER — TRANSCRIPTION ENCOUNTER (OUTPATIENT)
Age: 47
End: 2022-08-17

## 2022-12-22 ENCOUNTER — APPOINTMENT (OUTPATIENT)
Dept: BREAST CENTER | Facility: CLINIC | Age: 47
End: 2022-12-22

## 2022-12-22 ENCOUNTER — NON-APPOINTMENT (OUTPATIENT)
Age: 47
End: 2022-12-22

## 2022-12-22 VITALS
SYSTOLIC BLOOD PRESSURE: 103 MMHG | BODY MASS INDEX: 20.9 KG/M2 | HEART RATE: 73 BPM | HEIGHT: 70 IN | WEIGHT: 146 LBS | DIASTOLIC BLOOD PRESSURE: 68 MMHG

## 2022-12-22 PROCEDURE — 99214 OFFICE O/P EST MOD 30 MIN: CPT

## 2022-12-22 RX ORDER — ANASTROZOLE TABLETS 1 MG/1
1 TABLET ORAL
Refills: 0 | Status: ACTIVE | COMMUNITY

## 2022-12-22 NOTE — PHYSICAL EXAM
[de-identified] : B/l chest wall/axilla/supraclavicular area: No evidence of recurrence (no reconstruction)\par

## 2022-12-22 NOTE — HISTORY OF PRESENT ILLNESS
[FreeTextEntry1] : Patient is a 46yo F who presents for breast cancer surveillance. S/p neoadjuvant chemotherapy w/ Dr. Garcia ([facility] 1/4/22-4/26/22) for L triple positive IDC. Hx of B/l TM & L SNB 6/16/22 (age 46) yielding R benign tissue/skin. L no residual invasive carcinoma, 3.5cm DCIS, negative margins, 0/2LN. Patient med with Dr. Starkey who did not recommend adjuvant RT. Previously taking Tamoxifen, now on Anastrazole (Dr. Garcia). Fhx of breast cancer in paternal cousin, age 60s. Patient is APC +, and BRCA negative (tested 2021). Patient denies palpable masses, skin changes, or nipple discharge bilaterally. Of note, underwent KELIN/BSO 10/25/22 with Dr. Devine. \par \par 11/18/21: B/L MG & US (c/o palpable L mass)- L UOQ 3:00, 3FN irregular shaped mass (area of palpable concern). Associated L 3-4:00, 2-4FN pleomorphic microcalcs. US- L 2.4cm 4:00, 5FN hypoechoic irregularly shaped mass w/ indistinct margins containing foci of calcs. BIRADS 5\par 12/1/21: L US guided core for 2.4cm 4:00 mass (CBL)- "Wing" clip. Invasive ductal carcinoma, poorly differentiated. ER+ (96%), NE+ (68%), HER 2+ (3+). Concordant. Clip is at the lateral aspect of the microcalcs. Rec. MRI for extent of disease.In addition to the ultrasound mass which was sampled, there are adjacent pleomorphic microcalcifications seen on the mammogram suggesting involvement of at least a 3 cm area.\par 12/14/21: MRI- R KRISTI. L- 2.7cm enhancing mass consistent with known malignancy dx. L nonmass enhancement extending from anterior and inferior aspect of mass likely representing intraductal tumor extension. Discussed with Dr. Cook pt's MG findings 11/18/21. It was recommended to patient to get L stereo bx for associated pleomorphic calc 3-4:00 2-4FN.\par 12/23/22: L stereo bx calcs 4:00 (bar clip, placed 4 cm inferior and anterior to the wing clip)-  DCIS, high grade, ER/NE negative.\par 5/5/22: B/l MG & US- heterogenously dense. L calcs 4.8cm LOQ (w/ bx clip). L ovoid lobulated hyperdense mass 4:00 less apparent (w/ ribbon clip). US- L mild heterogeneity w/o mass 4:00 5FN. BI-RADS 6\par 5/5/22: MRI- L tissue clip w/ minimal surround enhancing 4:00 LOQ (site of 2.7cm mass on prior study). L scattered foci of enhancement present at near inferior extent of calcs. Rec multiple wire locs if conservation is attempted. BI-RADS 6\par 6/16/22: B/l TM & L SNLB- R benign tissue/skin. L no residual invasive carcinoma, 3.5cm DCIS, negative margins, 0/2LN.

## 2022-12-22 NOTE — PAST MEDICAL HISTORY
[Surgical Menopause] : The patient is in surgical menopause [History of Hormone Replacement Treatment] : has no history of hormone replacement treatment [de-identified] : hysterectomy  [FreeTextEntry6] : no [FreeTextEntry7] : yes [FreeTextEntry8] : n/a

## 2023-03-12 ENCOUNTER — NON-APPOINTMENT (OUTPATIENT)
Age: 48
End: 2023-03-12

## 2023-03-20 NOTE — ASU PATIENT PROFILE, ADULT - 
ADDITIONAL INFORMATION
PCR 6/13/22 NW Size Of Lesion In Cm (Optional): 0 Detail Level: Zone Procedure To Be Performed At Next Visit: Liquid nitrogen Introduction Text (Please End With A Colon): The following procedure was deferred:

## 2023-06-01 ENCOUNTER — NON-APPOINTMENT (OUTPATIENT)
Age: 48
End: 2023-06-01

## 2023-06-08 ENCOUNTER — APPOINTMENT (OUTPATIENT)
Dept: BREAST CENTER | Facility: CLINIC | Age: 48
End: 2023-06-08

## 2023-06-23 ENCOUNTER — APPOINTMENT (OUTPATIENT)
Dept: BREAST CENTER | Facility: CLINIC | Age: 48
End: 2023-06-23
Payer: COMMERCIAL

## 2023-06-23 VITALS
HEIGHT: 70 IN | BODY MASS INDEX: 21.19 KG/M2 | SYSTOLIC BLOOD PRESSURE: 95 MMHG | WEIGHT: 148 LBS | HEART RATE: 112 BPM | DIASTOLIC BLOOD PRESSURE: 64 MMHG

## 2023-06-23 DIAGNOSIS — Z78.9 OTHER SPECIFIED HEALTH STATUS: ICD-10-CM

## 2023-06-23 DIAGNOSIS — Z85.3 PERSONAL HISTORY OF MALIGNANT NEOPLASM OF BREAST: ICD-10-CM

## 2023-06-23 PROCEDURE — 99213 OFFICE O/P EST LOW 20 MIN: CPT

## 2023-06-23 RX ORDER — METRONIDAZOLE 7.5 MG/G
0.75 GEL VAGINAL
Qty: 1 | Refills: 0 | Status: DISCONTINUED | COMMUNITY
Start: 2022-08-05 | End: 2023-06-23

## 2023-06-23 NOTE — HISTORY OF PRESENT ILLNESS
[FreeTextEntry1] : Patient is a 49 yo F who presents for breast cancer surveillance. S/p neoadjuvant chemotherapy w/ Dr. Garcia ([facility] 1/4/22-4/26/22) for L triple positive IDC. Hx of B/l TM & L SNB 6/16/22 (age 46) yielding R benign tissue/skin. L no residual invasive carcinoma, 3.5cm DCIS, negative margins, 0/2LN. Patient med with Dr. Starkey who did not recommend adjuvant RT. Previously taking Tamoxifen, now on Anastrazole (Dr. Garcia). Pt completed Phesgo 4/2023. Had revision surgery w/ Dr. Gonzalez (10/2022). Fhx of breast cancer in paternal cousin (age 60s). Patient is APC +, and BRCA negative (tested 2021). Patient denies palpable masses, skin changes, or nipple discharge bilaterally. Of note, underwent KELIN/BSO 10/25/22 with Dr. Devine. Patient underwent fissurectomy 12/2022 and has residual L shoulder tendon damage/unable to raise arm from surgical positioning and is undergoing PT. \par \par 11/18/21: B/L MG & US (c/o palpable L mass)- L UOQ 3:00, 3FN irregular shaped mass (area of palpable concern). Associated L 3-4:00, 2-4FN pleomorphic microcalcs. US- L 2.4cm 4:00, 5FN hypoechoic irregularly shaped mass w/ indistinct margins containing foci of calcs. BIRADS 5\par 12/1/21: L US guided core for 2.4cm 4:00 mass (CBL)- "Wing" clip. Invasive ductal carcinoma, poorly differentiated. ER+ (96%), [place]+ (68%), HER 2+ (3+). Concordant. Clip is at the lateral aspect of the microcalcs. Rec. MRI for extent of disease.In addition to the ultrasound mass which was sampled, there are adjacent pleomorphic microcalcifications seen on the mammogram suggesting involvement of at least a 3 cm area.\par 12/14/21: MRI- R KRISTI. L- 2.7cm enhancing mass consistent with known malignancy dx. L nonmass enhancement extending from anterior and inferior aspect of mass likely representing intraductal tumor extension. Discussed with Dr. Cook pt's MG findings 11/18/21. It was recommended to patient to get L stereo bx for associated pleomorphic calc 3-4:00 2-4FN.\par 12/23/22: L stereo bx calcs 4:00 (bar clip, placed 4 cm inferior and anterior to the wing clip)-  DCIS, high grade, ER/TX negative.\par 5/5/22: B/l MG & US- heterogenously dense. L calcs 4.8cm LOQ (w/ bx clip). L ovoid lobulated hyperdense mass 4:00 less apparent (w/ ribbon clip). US- L mild heterogeneity w/o mass 4:00 5FN. BI-RADS 6\par 5/5/22: MRI- L tissue clip w/ minimal surround enhancing 4:00 LOQ (site of 2.7cm mass on prior study). L scattered foci of enhancement present at near inferior extent of calcs. Rec multiple wire locs if conservation is attempted. BI-RADS 6\par 6/16/22: B/l TM & L SNLB- R benign tissue/skin. L no residual invasive carcinoma, 3.5cm DCIS, negative margins, 0/2LN.

## 2023-06-23 NOTE — PHYSICAL EXAM
[Normocephalic] : normocephalic [EOMI] : extra ocular movement intact [Supple] : supple [No Supraclavicular Adenopathy] : no supraclavicular adenopathy [No Cervical Adenopathy] : no cervical adenopathy [de-identified] : B/l chest wall/axilla/supraclavicular area: No evidence of recurrence (no reconstruction)\par

## 2023-06-23 NOTE — PAST MEDICAL HISTORY
[Surgical Menopause] : The patient is in surgical menopause [Menarche Age ____] : age at menarche was [unfilled] [Definite ___ (Date)] : the last menstrual period was [unfilled] [Irregular Cycle Intervals] : are  irregular [Total Preg ___] : G[unfilled] [History of Hormone Replacement Treatment] : has no history of hormone replacement treatment [de-identified] : hysterectomy  [FreeTextEntry6] : no [FreeTextEntry7] : yes [FreeTextEntry8] : n/a

## 2024-01-19 ENCOUNTER — APPOINTMENT (OUTPATIENT)
Dept: BREAST CENTER | Facility: CLINIC | Age: 49
End: 2024-01-19
Payer: COMMERCIAL

## 2024-01-19 VITALS
BODY MASS INDEX: 22.9 KG/M2 | HEART RATE: 105 BPM | DIASTOLIC BLOOD PRESSURE: 74 MMHG | SYSTOLIC BLOOD PRESSURE: 108 MMHG | HEIGHT: 70 IN | WEIGHT: 160 LBS

## 2024-01-19 DIAGNOSIS — Z78.9 OTHER SPECIFIED HEALTH STATUS: ICD-10-CM

## 2024-01-19 DIAGNOSIS — Z80.3 FAMILY HISTORY OF MALIGNANT NEOPLASM OF BREAST: ICD-10-CM

## 2024-01-19 DIAGNOSIS — Z90.13 ACQUIRED ABSENCE OF BILATERAL BREASTS AND NIPPLES: ICD-10-CM

## 2024-01-19 DIAGNOSIS — Z85.3 ENCOUNTER FOR FOLLOW-UP EXAMINATION AFTER COMPLETED TREATMENT FOR MALIGNANT NEOPLASM: ICD-10-CM

## 2024-01-19 DIAGNOSIS — Z08 ENCOUNTER FOR FOLLOW-UP EXAMINATION AFTER COMPLETED TREATMENT FOR MALIGNANT NEOPLASM: ICD-10-CM

## 2024-01-19 DIAGNOSIS — C50.912 MALIGNANT NEOPLASM OF UNSPECIFIED SITE OF LEFT FEMALE BREAST: ICD-10-CM

## 2024-01-19 PROCEDURE — 99213 OFFICE O/P EST LOW 20 MIN: CPT

## 2024-01-19 RX ORDER — METOPROLOL TARTRATE 75 MG/1
TABLET, FILM COATED ORAL
Refills: 0 | Status: DISCONTINUED | COMMUNITY
End: 2024-01-19

## 2024-01-19 NOTE — PHYSICAL EXAM
[Normocephalic] : normocephalic [EOMI] : extra ocular movement intact [Supple] : supple [No Supraclavicular Adenopathy] : no supraclavicular adenopathy [No Cervical Adenopathy] : no cervical adenopathy [de-identified] : B/l chest wall/axilla/supraclavicular area: No evidence of recurrence (no reconstruction)\par

## 2024-01-19 NOTE — PAST MEDICAL HISTORY
[Surgical Menopause] : The patient is in surgical menopause [Menarche Age ____] : age at menarche was [unfilled] [Definite ___ (Date)] : the last menstrual period was [unfilled] [Irregular Cycle Intervals] : are  irregular [Total Preg ___] : G[unfilled] [History of Hormone Replacement Treatment] : has no history of hormone replacement treatment [de-identified] : hysterectomy  [FreeTextEntry6] : no [FreeTextEntry7] : yes [FreeTextEntry8] : n/a

## 2024-01-19 NOTE — HISTORY OF PRESENT ILLNESS
[FreeTextEntry1] : Patient is a 49 yo F who presents for breast cancer surveillance. S/p neoadjuvant chemotherapy w/ Dr. Garcia ([facility] 1/4/22-4/26/22) for L triple positive IDC. Hx of B/l TM & L SNB 6/16/22 (age 46) yielding R benign tissue/skin. L no residual invasive carcinoma, 3.5cm DCIS, negative margins, 0/2LN. Patient med with Dr. Starkey who did not recommend adjuvant RT. Previously taking Tamoxifen, now on Anastrozole (Dr. Garcia). Pt completed Phesgo 4/2023. Had revision surgery w/ Dr. Gonzalez (10/2022). Fhx of breast cancer in paternal cousin (age 60s). Patient is APC +, and BRCA negative (tested 2021). Patient denies palpable masses or skin changes bilaterally. Of note, underwent KELIN/BSO 10/25/22 with Dr. Devine. Patient underwent fissurectomy 12/2022 and has residual L shoulder tendon damage/unable to raise arm from surgical positioning and is undergoing PT. Patient followed by Dr. Charmaine Christian (cardiology). Patient is no longer taking metoprolol and feels that she is tolerating anastrozole well.  11/18/21: B/L MG & US (c/o palpable L mass)- L UOQ 3:00, 3FN irregular shaped mass (area of palpable concern). Associated L 3-4:00, 2-4FN pleomorphic microcalcs. US- L 2.4cm 4:00, 5FN hypoechoic irregularly shaped mass w/ indistinct margins containing foci of calcs. BIRADS 5 12/1/21: L US guided core for 2.4cm 4:00 mass (CBL)- "Wing" clip. Invasive ductal carcinoma, poorly differentiated. ER+ (96%), [place]+ (68%), HER 2+ (3+). Concordant. Clip is at the lateral aspect of the microcalcs. Rec. MRI for extent of disease.In addition to the ultrasound mass which was sampled, there are adjacent pleomorphic microcalcifications seen on the mammogram suggesting involvement of at least a 3 cm area. 12/14/21: MRI- R KRISTI. L- 2.7cm enhancing mass consistent with known malignancy dx. L nonmass enhancement extending from anterior and inferior aspect of mass likely representing intraductal tumor extension. Discussed with Dr. Cook pt's MG findings 11/18/21. It was recommended to patient to get L stereo bx for associated pleomorphic calc 3-4:00 2-4FN. 12/23/22: L stereo bx calcs 4:00 (bar clip, placed 4 cm inferior and anterior to the wing clip)-  DCIS, high grade, ER/WV negative. 5/5/22: B/l MG & US- heterogenously dense. L calcs 4.8cm LOQ (w/ bx clip). L ovoid lobulated hyperdense mass 4:00 less apparent (w/ ribbon clip). US- L mild heterogeneity w/o mass 4:00 5FN. BI-RADS 6 5/5/22: MRI- L tissue clip w/ minimal surround enhancing 4:00 LOQ (site of 2.7cm mass on prior study). L scattered foci of enhancement present at near inferior extent of calcs. Rec multiple wire locs if conservation is attempted. BI-RADS 6 6/16/22: B/l TM & L SNLB- R benign tissue/skin. L no residual invasive carcinoma, 3.5cm DCIS, negative margins, 0/2LN.

## 2024-06-24 ENCOUNTER — APPOINTMENT (OUTPATIENT)
Dept: BREAST CENTER | Facility: CLINIC | Age: 49
End: 2024-06-24

## 2025-05-06 NOTE — BRIEF OPERATIVE NOTE - DISPOSITION
-- DO NOT REPLY / DO NOT REPLY ALL --  -- This inbox is not monitored. If this was sent to the wrong provider or department, reroute message to P [facility] Reroute pool. --  -- Message is from Engagement Center Operations (ECO) --    General Patient Message: Father of the patient would like a call back to discuss getting a name of a doctor do do a Circumcision Please call back to assist.   Caller Information       Contact Date/Time Type Contact Phone/Fax    04/30/2025 02:45 PM CDT Phone (Incoming) Leo Dong [phone]            Alternative phone number: no    Can a detailed message be left? Yes - Voicemail   Patient has been advised the message will be addressed within 2-3 business days.              Copied from CRM #[id number]. Topic: MW Messaging - MW Patient Request  >> Apr 30, 2025  2:52 PM Basilio SARMIENTO wrote:  Leo Dong called requesting to send a general message to clinician.   Verified issue is NOT regarding a symptom(s) requiring routine or emergent triage. Verified another message template type and CRM does not apply.    Selected 'Wrap Up CRM' and created new Telephone Encounter after clicking 'Convert to Clinical Call'. Selected appropriate Reason for Call.  Sent Pt message template and routed as routine priority per Clinician KB page to appropriate clinician pool. Readback full message.  
Ped uro consult placed. There was one in from February from Dr. Miranda. If they want it to [facility] may need to be faxed. Thanks.  
Home

## (undated) DEVICE — DRSG STERISTRIPS 0.25 X 3"

## (undated) DEVICE — SUT MONOCRYL 4-0 27" PS-2 UNDYED

## (undated) DEVICE — SYR LUER LOK 10CC

## (undated) DEVICE — DURABLE MEDICAL EQUIPMENT: Type: DURABLE MEDICAL EQUIPMENT

## (undated) DEVICE — Device

## (undated) DEVICE — SUT SILK 2-0 30" SH

## (undated) DEVICE — DRSG TELFA 3 X 8

## (undated) DEVICE — SUT VICRYL 3-0 27" SH UNDYED

## (undated) DEVICE — PACK PROC UNIV MAYO STAND 29118

## (undated) DEVICE — ELCTR BOVIE TIP BLADE INSULATED 2.75" EDGE

## (undated) DEVICE — SUT QUILL MONODERM 3-0 30CM 19MM

## (undated) DEVICE — PREP BETADINE SPONGE STICKS

## (undated) DEVICE — GLV 7.5 PROTEXIS (WHITE)

## (undated) DEVICE — PACK BREAST RECONSTRUCTION

## (undated) DEVICE — ONETRAC LIGHTED RETRACTOR 135 X 30MM DISP

## (undated) DEVICE — MARKING PEN W RULER

## (undated) DEVICE — DRAPE MEDIUM SHEET 44" X 70"

## (undated) DEVICE — IRR SYS BONE CLNNG INTERPULSE

## (undated) DEVICE — SUT VICRYL 2-0 27" SH UNDYED

## (undated) DEVICE — DRAPE CAMERA VIDEO 7"X96"

## (undated) DEVICE — SUT STRATAFIX SPIRAL MONOCRYL PLUS 4-0 14CM PS-2 UNDYED

## (undated) DEVICE — VENODYNE/SCD SLEEVE CALF MEDIUM

## (undated) DEVICE — DRAPE TOP SHEET 53" X 101"

## (undated) DEVICE — WARMING BLANKET LOWER ADULT

## (undated) DEVICE — PACK UPPER BODY

## (undated) DEVICE — SUT SILK 2-0 30" PSL

## (undated) DEVICE — KIT DRAIN 100CC W/10MM PERF

## (undated) DEVICE — SYR LUER LOK 5CC

## (undated) DEVICE — SUT SILK 0 30" SH

## (undated) DEVICE — DRSG GAUZE FLUFF 1PLY 18X30"

## (undated) DEVICE — SUT VICRYL 2-0 27" SH

## (undated) DEVICE — STAPLER SKIN PROXIMATE

## (undated) DEVICE — SUT MONOCRYL 3-0 27" SH

## (undated) DEVICE — ELCTR STRYKER NEPTUNE SMOKE EVACUATION PENCIL (GREEN)

## (undated) DEVICE — LAVAGE SIMPLUSE

## (undated) DEVICE — DRAPE TOWEL BLUE 17" X 24"

## (undated) DEVICE — DRAPE IOBAN 23" X 23"

## (undated) DEVICE — DRAPE PROBE COVER 5" X 96"

## (undated) DEVICE — SUT STRATAFIX SPIRAL MONOCRYL PLUS 3-0 30CM PS-1 UNDYED

## (undated) DEVICE — DRSG BIOPATCH DISK W CHG 1" W 4.0MM HOLE

## (undated) DEVICE — DRSG STOCKINETTE TUBULAR COTTON 2PLY 6X60"

## (undated) DEVICE — ELCTR STRYKER NEPTUNE BLADE COATED, INSULATED 70MM

## (undated) DEVICE — SUT SILK 2-0 18" PS

## (undated) DEVICE — DRSG TEGADERM 4X4.75"